# Patient Record
Sex: MALE | Race: OTHER | URBAN - METROPOLITAN AREA
[De-identification: names, ages, dates, MRNs, and addresses within clinical notes are randomized per-mention and may not be internally consistent; named-entity substitution may affect disease eponyms.]

---

## 2017-07-11 ENCOUNTER — OFFICE VISIT (OUTPATIENT)
Dept: OTOLARYNGOLOGY | Facility: CLINIC | Age: 52
End: 2017-07-11

## 2017-07-11 VITALS — TEMPERATURE: 97 F | DIASTOLIC BLOOD PRESSURE: 80 MMHG | SYSTOLIC BLOOD PRESSURE: 110 MMHG

## 2017-07-11 DIAGNOSIS — H90.5 SENSORINEURAL HEARING LOSS (SNHL), UNSPECIFIED LATERALITY: Primary | ICD-10-CM

## 2017-07-11 PROCEDURE — 99212 OFFICE O/P EST SF 10 MIN: CPT | Performed by: OTOLARYNGOLOGY

## 2017-07-11 PROCEDURE — 99243 OFF/OP CNSLTJ NEW/EST LOW 30: CPT | Performed by: OTOLARYNGOLOGY

## 2017-07-11 RX ORDER — ATORVASTATIN CALCIUM 20 MG/1
20 TABLET, FILM COATED ORAL NIGHTLY
COMMUNITY

## 2017-07-12 NOTE — PATIENT INSTRUCTIONS
Common Middle Ear Problems  Your middle ear may have been injured or infected recently. Over time, certain growths or bone disease can also harm the middle ear. Left untreated, middle ear problems often lead to lifelong hearing loss.  There are two types

## 2017-07-12 NOTE — PROGRESS NOTES
Kenisha Parra is a 47year old male. Patient presents with:  Hearing Loss: Pt states started losing bilateral hearing about a year ago, has not had a hearing test in the past, [place], states bilateral ears feel clogged.      HPI: Anterior perforation right     ASSESSMENT AND PLAN:   1.  Sensorineural hearing loss (SNHL), unspecified laterality  Suspected to be a blockage of the ETD on the right.  he does have a small amount of serous otitis media right ear as it has been will be kevin

## 2017-08-02 ENCOUNTER — TELEPHONE (OUTPATIENT)
Dept: OTOLARYNGOLOGY | Facility: CLINIC | Age: 52
End: 2017-08-02

## 2017-08-02 NOTE — TELEPHONE ENCOUNTER
Spoke to son, informed pt will need prior auth for Audiogram to be done. States received prior auth from pcp and will be faxing information to office. No further action required at this time.

## 2017-08-02 NOTE — TELEPHONE ENCOUNTER
States called pcp and was informed no Prior Alexandre Cruz is needed for Audiogram, states PCP office will call office to give confirmation number to have audiogram done. Awaiting office call.

## 2017-08-03 ENCOUNTER — TELEPHONE (OUTPATIENT)
Dept: OTOLARYNGOLOGY | Facility: CLINIC | Age: 52
End: 2017-08-03

## 2017-08-03 NOTE — TELEPHONE ENCOUNTER
Spoke with pt's son and [facility], pt's son states that he was told by his primary physician that an authorization is not required and he would like his father to have an audiogram sche

## 2017-08-07 NOTE — TELEPHONE ENCOUNTER
Called pt's insurance [phone], spoke with Fco Fox, authorization is still pending, the office will receive a call from [facility] with case determination by Friday, 8-11 by 5 pm.

## 2017-08-08 NOTE — TELEPHONE ENCOUNTER
Received phone call from [facility] regarding approved prior authorization for hearing test with authorization number [id number] valid from 8/3/17 until 9/3/17, called and informed pt that he can schedule his hearing test, per pt he will call us

## 2017-08-22 ENCOUNTER — OFFICE VISIT (OUTPATIENT)
Dept: AUDIOLOGY | Facility: CLINIC | Age: 52
End: 2017-08-22

## 2017-08-22 ENCOUNTER — OFFICE VISIT (OUTPATIENT)
Dept: OTOLARYNGOLOGY | Facility: CLINIC | Age: 52
End: 2017-08-22

## 2017-08-22 VITALS
TEMPERATURE: 98 F | DIASTOLIC BLOOD PRESSURE: 66 MMHG | BODY MASS INDEX: 24.55 KG/M2 | SYSTOLIC BLOOD PRESSURE: 100 MMHG | HEIGHT: 68 IN | WEIGHT: 162 LBS

## 2017-08-22 DIAGNOSIS — H90.5 SENSORINEURAL HEARING LOSS (SNHL), UNSPECIFIED LATERALITY: Primary | ICD-10-CM

## 2017-08-22 DIAGNOSIS — H90.6 MIXED HEARING LOSS, BILATERAL: Primary | ICD-10-CM

## 2017-08-22 PROCEDURE — 92557 COMPREHENSIVE HEARING TEST: CPT | Performed by: AUDIOLOGIST

## 2017-08-22 PROCEDURE — 99213 OFFICE O/P EST LOW 20 MIN: CPT | Performed by: OTOLARYNGOLOGY

## 2017-08-22 PROCEDURE — 92567 TYMPANOMETRY: CPT | Performed by: AUDIOLOGIST

## 2017-08-22 PROCEDURE — 99212 OFFICE O/P EST SF 10 MIN: CPT | Performed by: OTOLARYNGOLOGY

## 2017-08-22 RX ORDER — AMOXICILLIN AND CLAVULANATE POTASSIUM 875; 125 MG/1; MG/1
TABLET, FILM COATED ORAL
COMMUNITY
Start: 2017-08-15 | End: 2021-04-02

## 2017-08-22 RX ORDER — ASPIRIN 81 MG
TABLET, DELAYED RELEASE (ENTERIC COATED) ORAL
COMMUNITY
Start: 2017-08-15

## 2017-08-22 NOTE — PROGRESS NOTES
AUDIOLOGY REPORT      Destinee Roy is a 47year old male     Referring Provider: Marleen Graf   YOB: 1965  Medical Record: [id number]      Patient Hearing History:   Patient reported decreased hearing, greater on the right ear.    He was

## 2017-08-22 NOTE — PROGRESS NOTES
Shanon Redd is a 47year old male. Patient presents with: Follow - Up: Hearing test result done on 8/22/17    HPI:   He continues to experience pressure in his ears, more in his right than his left. He feels that his hearing is less in his right ear. h Left: Normal. Pupil - Right: Normal, Left: Normal.    Ears Normal Inspection - Right: Normal, Left: Normal. Canal - Left: Normal. TM - Right: Normal, Left: Normal. Asymmetric sensorineural hearing loss, left ear worse.  Type A tympanograms     ASSESSMENT AN

## 2017-08-29 ENCOUNTER — TELEPHONE (OUTPATIENT)
Dept: OTOLARYNGOLOGY | Facility: CLINIC | Age: 52
End: 2017-08-29

## 2017-09-05 ENCOUNTER — TELEPHONE (OUTPATIENT)
Dept: OTOLARYNGOLOGY | Facility: CLINIC | Age: 52
End: 2017-09-05

## 2017-09-05 NOTE — TELEPHONE ENCOUNTER
Pt does not speak english, advised pt son that MRI prior authorization approved. Advised pt son per pt insurance he will need to go to [facility], and has until September 30 th. Prior authorization # [id number].  Advised that pt should have copy of r

## (undated) NOTE — LETTER
[facility]  [address]  [address] [facility] [place]       07/11/17        Patient: Filippo Ragland   YOB: 1965   Date of Visit: 7/11/2017       Dear  Dr. Anisha Cole Recipients,      Thank you for referring Filippo Obie to